# Patient Record
Sex: MALE | Race: WHITE
[De-identification: names, ages, dates, MRNs, and addresses within clinical notes are randomized per-mention and may not be internally consistent; named-entity substitution may affect disease eponyms.]

---

## 2019-03-06 ENCOUNTER — HOSPITAL ENCOUNTER (OUTPATIENT)
Dept: HOSPITAL 92 - ERS | Age: 49
Setting detail: OBSERVATION
LOS: 3 days | Discharge: HOME | End: 2019-03-09
Attending: INTERNAL MEDICINE | Admitting: INTERNAL MEDICINE
Payer: COMMERCIAL

## 2019-03-06 VITALS — BODY MASS INDEX: 25.9 KG/M2

## 2019-03-06 DIAGNOSIS — I50.22: ICD-10-CM

## 2019-03-06 DIAGNOSIS — Z95.1: ICD-10-CM

## 2019-03-06 DIAGNOSIS — Z88.1: ICD-10-CM

## 2019-03-06 DIAGNOSIS — E03.9: ICD-10-CM

## 2019-03-06 DIAGNOSIS — I25.10: ICD-10-CM

## 2019-03-06 DIAGNOSIS — I48.92: ICD-10-CM

## 2019-03-06 DIAGNOSIS — F41.9: ICD-10-CM

## 2019-03-06 DIAGNOSIS — Z79.01: ICD-10-CM

## 2019-03-06 DIAGNOSIS — Z88.0: ICD-10-CM

## 2019-03-06 DIAGNOSIS — I25.5: ICD-10-CM

## 2019-03-06 DIAGNOSIS — I48.0: ICD-10-CM

## 2019-03-06 DIAGNOSIS — R07.89: Primary | ICD-10-CM

## 2019-03-06 DIAGNOSIS — Z87.891: ICD-10-CM

## 2019-03-06 DIAGNOSIS — E78.5: ICD-10-CM

## 2019-03-06 DIAGNOSIS — I11.0: ICD-10-CM

## 2019-03-06 DIAGNOSIS — Z79.899: ICD-10-CM

## 2019-03-06 DIAGNOSIS — I25.2: ICD-10-CM

## 2019-03-06 DIAGNOSIS — Z79.82: ICD-10-CM

## 2019-03-06 DIAGNOSIS — F32.9: ICD-10-CM

## 2019-03-06 LAB
ALBUMIN SERPL BCG-MCNC: 4.5 G/DL (ref 3.5–5)
ALP SERPL-CCNC: 92 U/L (ref 40–150)
ALT SERPL W P-5'-P-CCNC: 17 U/L (ref 8–55)
ANION GAP SERPL CALC-SCNC: 17 MMOL/L (ref 10–20)
AST SERPL-CCNC: 22 U/L (ref 5–34)
BASOPHILS # BLD AUTO: 0 THOU/UL (ref 0–0.2)
BASOPHILS NFR BLD AUTO: 0.5 % (ref 0–1)
BILIRUB SERPL-MCNC: 1.8 MG/DL (ref 0.2–1.2)
BUN SERPL-MCNC: 17 MG/DL (ref 8.9–20.6)
CALCIUM SERPL-MCNC: 9.6 MG/DL (ref 7.8–10.44)
CHLORIDE SERPL-SCNC: 108 MMOL/L (ref 98–107)
CO2 SERPL-SCNC: 19 MMOL/L (ref 22–29)
CREAT CL PREDICTED SERPL C-G-VRATE: 0 ML/MIN (ref 70–130)
EOSINOPHIL # BLD AUTO: 0.2 THOU/UL (ref 0–0.7)
EOSINOPHIL NFR BLD AUTO: 2.2 % (ref 0–10)
GLOBULIN SER CALC-MCNC: 3 G/DL (ref 2.4–3.5)
GLUCOSE SERPL-MCNC: 74 MG/DL (ref 70–105)
HGB BLD-MCNC: 16.3 G/DL (ref 14–18)
LYMPHOCYTES # BLD: 1.1 THOU/UL (ref 1.2–3.4)
LYMPHOCYTES NFR BLD AUTO: 16.4 % (ref 21–51)
MCH RBC QN AUTO: 33.1 PG (ref 27–31)
MCV RBC AUTO: 102 FL (ref 78–98)
MONOCYTES # BLD AUTO: 0.7 THOU/UL (ref 0.11–0.59)
MONOCYTES NFR BLD AUTO: 9.8 % (ref 0–10)
NEUTROPHILS # BLD AUTO: 4.9 THOU/UL (ref 1.4–6.5)
NEUTROPHILS NFR BLD AUTO: 71.1 % (ref 42–75)
PLATELET # BLD AUTO: 100 THOU/UL (ref 130–400)
POTASSIUM SERPL-SCNC: 4.9 MMOL/L (ref 3.5–5.1)
RBC # BLD AUTO: 4.93 MILL/UL (ref 4.7–6.1)
SODIUM SERPL-SCNC: 139 MMOL/L (ref 136–145)
WBC # BLD AUTO: 6.9 THOU/UL (ref 4.8–10.8)

## 2019-03-06 PROCEDURE — 96374 THER/PROPH/DIAG INJ IV PUSH: CPT

## 2019-03-06 PROCEDURE — 80053 COMPREHEN METABOLIC PANEL: CPT

## 2019-03-06 PROCEDURE — 80048 BASIC METABOLIC PNL TOTAL CA: CPT

## 2019-03-06 PROCEDURE — 93005 ELECTROCARDIOGRAM TRACING: CPT

## 2019-03-06 PROCEDURE — 85025 COMPLETE CBC W/AUTO DIFF WBC: CPT

## 2019-03-06 PROCEDURE — 99285 EMERGENCY DEPT VISIT HI MDM: CPT

## 2019-03-06 PROCEDURE — 93010 ELECTROCARDIOGRAM REPORT: CPT

## 2019-03-06 PROCEDURE — 80306 DRUG TEST PRSMV INSTRMNT: CPT

## 2019-03-06 PROCEDURE — 83735 ASSAY OF MAGNESIUM: CPT

## 2019-03-06 PROCEDURE — 78452 HT MUSCLE IMAGE SPECT MULT: CPT

## 2019-03-06 PROCEDURE — 36415 COLL VENOUS BLD VENIPUNCTURE: CPT

## 2019-03-06 PROCEDURE — 96375 TX/PRO/DX INJ NEW DRUG ADDON: CPT

## 2019-03-06 PROCEDURE — 83880 ASSAY OF NATRIURETIC PEPTIDE: CPT

## 2019-03-06 PROCEDURE — 71045 X-RAY EXAM CHEST 1 VIEW: CPT

## 2019-03-06 PROCEDURE — 93017 CV STRESS TEST TRACING ONLY: CPT

## 2019-03-06 PROCEDURE — A9500 TC99M SESTAMIBI: HCPCS

## 2019-03-06 PROCEDURE — 94760 N-INVAS EAR/PLS OXIMETRY 1: CPT

## 2019-03-06 PROCEDURE — 93306 TTE W/DOPPLER COMPLETE: CPT

## 2019-03-06 PROCEDURE — G0378 HOSPITAL OBSERVATION PER HR: HCPCS

## 2019-03-06 PROCEDURE — 84484 ASSAY OF TROPONIN QUANT: CPT

## 2019-03-06 PROCEDURE — 80061 LIPID PANEL: CPT

## 2019-03-06 NOTE — RAD
ONE VIEW CHEST:

3/6/19

 

HISTORY: 

Pain.

 

COMPARISON:  

None.

 

FINDINGS:  

There is sternotomy wires. Epicardial pacer wires are also noted. Normal cardiac silhouette. Lungs an
d pleural spaces are clear.  No pneumothorax or osseous abnormalities.

 

IMPRESSION:  

No acute cardiopulmonary process. 

 

POS: [facility]

## 2019-03-07 LAB
CHD RISK SERPL-RTO: 2.9 (ref ?–4.5)
CHOLEST SERPL-MCNC: 95 MG/DL
DRUG SCREEN CUTOFF: (no result)
HDLC SERPL-MCNC: 33 MG/DL
LDLC SERPL CALC-MCNC: 56 MG/DL
MEDTOX CONTROL LINE VALID?: (no result)
MEDTOX READER #: (no result)
TRIGL SERPL-MCNC: 32 MG/DL (ref ?–150)
TROPONIN I SERPL DL<=0.01 NG/ML-MCNC: (no result) NG/ML (ref ?–0.03)
TROPONIN I SERPL DL<=0.01 NG/ML-MCNC: (no result) NG/ML (ref ?–0.03)

## 2019-03-07 RX ADMIN — Medication SCH ML: at 21:41

## 2019-03-07 RX ADMIN — ONDANSETRON PRN MG: 2 INJECTION INTRAMUSCULAR; INTRAVENOUS at 16:44

## 2019-03-07 RX ADMIN — NITROGLYCERIN SCH: 20 OINTMENT TOPICAL at 05:57

## 2019-03-07 RX ADMIN — ASPIRIN SCH MG: 81 TABLET ORAL at 14:34

## 2019-03-07 RX ADMIN — ONDANSETRON PRN MG: 2 INJECTION INTRAMUSCULAR; INTRAVENOUS at 21:44

## 2019-03-07 RX ADMIN — CYANOCOBALAMIN TAB 1000 MCG SCH MCG: 1000 TAB at 14:33

## 2019-03-07 RX ADMIN — Medication SCH ML: at 14:38

## 2019-03-07 RX ADMIN — ONDANSETRON PRN MG: 2 INJECTION INTRAMUSCULAR; INTRAVENOUS at 09:41

## 2019-03-07 RX ADMIN — NITROGLYCERIN SCH INCH: 20 OINTMENT TOPICAL at 21:41

## 2019-03-07 RX ADMIN — HYDROCODONE BITARTRATE AND ACETAMINOPHEN PRN TAB: 5; 325 TABLET ORAL at 10:02

## 2019-03-07 RX ADMIN — HYDROCODONE BITARTRATE AND ACETAMINOPHEN PRN TAB: 5; 325 TABLET ORAL at 05:55

## 2019-03-07 RX ADMIN — NITROGLYCERIN SCH INCH: 20 OINTMENT TOPICAL at 14:37

## 2019-03-07 NOTE — PDOC.PN
- Subjective


Encounter Start Date: 03/07/19


Encounter Start Time: 17:06


Subjective: cont to have chest pain, asking for iv morphine





- Objective


Resuscitation Status - Order Detail:





03/07/19 01:42


Resuscitation Status Routine 


   Resuscitation Status: FULL: Full Resuscitation








MAR Reviewed: Yes


Vital Signs & Weight: 


 Vital Signs (12 hours)











  Temp Pulse Resp BP Pulse Ox


 


 03/07/19 16:21  98.1 F  65  18  119/69  99


 


 03/07/19 13:53  97.5 F L  87  18  157/88 H  99


 


 03/07/19 08:11  97.7 F  58 L  18  115/66  97








 Weight











Weight                         186 lb 1.6 oz














Result Diagrams: 


 03/06/19 21:36





 03/06/19 21:36





Phys Exam





- Physical Examination


Constitutional: NAD


Neck: no JVD


Respiratory: clear to auscultation bilateral


Cardiovascular: RRR, no significant murmur


Gastrointestinal: soft, positive bowel sounds


Musculoskeletal: no edema





Dx/Plan


(1) Chest pain


Code(s): R07.9 - CHEST PAIN, UNSPECIFIED   Status: Acute   


Qualifiers: 


   Chest pain type: unspecified   Qualified Code(s): R07.9 - Chest pain, 

unspecified   





(2) CAD (coronary artery disease)


Code(s): I25.10 - ATHSCL HEART DISEASE OF NATIVE CORONARY ARTERY W/O ANG PCTRS 

  Status: Chronic   


Qualifiers: 


   Coronary Disease-Associated Artery/Lesion type: native artery   Native vs. 

transplanted heart: native heart   Associated angina: without angina   

Qualified Code(s): I25.10 - Atherosclerotic heart disease of native coronary 

artery without angina pectoris   





(3) Cardiomyopathy


Code(s): I42.9 - CARDIOMYOPATHY, UNSPECIFIED   Status: Chronic   


Qualifiers: 


   Cardiomyopathy type: ischemic   Qualified Code(s): I25.5 - Ischemic 

cardiomyopathy   





(4) HTN (hypertension)


Code(s): I10 - ESSENTIAL (PRIMARY) HYPERTENSION   Status: Chronic   


Qualifiers: 


   Hypertension type: essential hypertension   Qualified Code(s): I10 - 

Essential (primary) hypertension   





(5) Atrial fibrillation


Code(s): I48.91 - UNSPECIFIED ATRIAL FIBRILLATION   Status: Acute   


Qualifiers: 


   Atrial fibrillation type: paroxysmal   Qualified Code(s): I48.0 - Paroxysmal 

atrial fibrillation   





- Plan


stress test neg, discussed with patient. on no anticag, requested cardiolog


-: consult


-: cont home meds





* .

## 2019-03-07 NOTE — NM
NUCLEAR MEDICINE CARDIAC STRESS TEST WITH EJECTION FRACTION:

 

HISTORY:  

Chest pain. 

 

COMPARISON:  

None. 

 

TECHNIQUE:  

Stress and rest was performed after the intravenous administration of 30.6  and 9.6 mCi technetium-99
m sestamibi, respectively. 

 

FINDINGS:

There is a large inferior and lateral wall scar extending to the apex. There is dyskinesia of the inf
erior and lateral wall. 

 

The calculated ejection fraction measures 39%. 

 

IMPRESSION: 

1.  Large scar inferior and lateral wall extending to the apex without significant periinfarct ischem
ia appreciated. 

 

2.  Low ejection fraction of 39%. 

 

 

 

POS: [facility]

## 2019-03-07 NOTE — HP
PRIMARY CARE PHYSICIAN:  [facility].



The patient is seen and examined on the day of 2019.



REASON FOR ADMISSION:  Chest pain.



HISTORY OF PRESENT ILLNESS:  A 48-year-old male who has a history of aortic

dissection and coronary artery disease required open heart surgery with CABG in

.  At that time, the patient required hospitalization for about 
one

month.  The patient also reports that he has a history of MI twice when he was 
very

young and the second time at age of 42.  The patient also has underlying 
history of

congestive heart failure and the patient required several times ablation for 
atrial

tachycardia and atrial arrhythmia.  The patient is originally from [place], [place] 
and

he had surgery done and his cardiologist and his primary care physician in [place], [place].  He mainly follows [facility] in [place].  The patient also reports

that he has planned ablation for his atrial tachycardia in the near soon. 



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

that is why he was from [place] to here.  He was experiencing yesterday palpitation
,

but the palpitations subsided after couple of hours during nighttime.  This 
night

when he was working and cleaning his desk, at that point, the patient was having

chest pain as well as palpitation and that is why coworker called paramedics 
and the

patient was brought to emergency room for evaluation. 



Paramedics already gave him nitroglycerin sublingual as well as aspirin, Zofran
, and

the patient was feeling much better afterwards.  When I saw this patient in the

emergency room, he was not having any further chest pain.  He also reported that

when he had chest pain, at that time the pain radiated to right upper 
extremity.  He

denies any syncope.  He denies any orthopnea or PND.  He denies any lower 
extremity

edema or calf tenderness.  He denies any hemoptysis or cough which he started 
last

night.  He denies any flu-like illness. 



REVIEW OF SYSTEMS:  CONSTITUTIONAL:  Negative for weight loss or gain, sense of

well-being, ability to conduct usual activities, exercise tolerance. 

SKIN/BREAST:  Negative for rash, itching, changes in hair growth or loss, nail

changes, breast lumps, tenderness, swelling, nipple discharge. 

EYES:  Negative for vision, double vision, tearing, blind spots, pain. 

ENT/MOUTH:  Negative for headaches, vertigo, lightheadedness, injury. Vision, 
double

vision, tearing, blind spots, pain, nose bleeding, colds, obstruction, discharge
,

dental difficulties, gingival bleeding, dentures, neck stiffness, pain, 
tenderness,

masses in thyroid or other areas 

CARDIOVASCULAR:  Negative for precordial pain, substernal distress, palpitations
,

syncope, dyspnea on exertion, orthopnea, nocturnal paroxysmal dyspnea, edema,

cyanosis, hypertension, heart murmurs, varicosities, phlebitis, claudication. 

RESPIRATORY:  Negative for pain, shortness of breath, wheezing, stridor, cough,

hemoptysis, fever or night sweats 

GASTROINTESTINAL:  Negative for poor appetite, dysphagia, indigestion, abdominal

pain, heartburn, eructation, nausea, vomiting, hematemesis, jaundice, 
constipation,

or diarrhea, abnormal stools (erika-colored, tarry, bloody, greasy, foul smelling
),

flatulence, hemorrhoids, recent changes in bowel habits. 

GENITOURINARY:  Negative for urgency, frequency, dysuria, nocturia, hematuria,

polyuria, oliguria, unusual (or change in) color of urine, stones, hesitancy, 
change

in size of stream, dribbling, acute retention or incontinence, libido, potency. 

MUSCULOSKELETAL:  Negative for pain, swelling, redness or heat of muscles or 
joints,

limitation of motion, muscular weakness, atrophy, cramps. 

NEUROLOGIC/PSYCHIATRIC:  Negative for convulsions, paralyses, tremor,

incoordination, parasthesias, difficulties with memory of speech, sensory or 
motor

disturbances, or muscular coordination (ataxia, tremor), emotional problems,

anxiety, depression, previous psychiatric care, unusual perceptions, 
hallucinations. 

ALLERGY/IMMUNOLOGIC:  Negative for skin rash, anemia, bleeding tendency, 
polydipsia,

polyuria, intolerance to heat or cold. 



Please see my HPI for pertinent positives and negatives.  All other review of

systems reviewed and negative except as mentioned in HPI. 



PAST MEDICAL HISTORY:  Per patient, the patient has history of aortic dissection
,

required surgery, 2 MI required CABG, hypertension dyslipidemia, 
hypothyroidism. 



PAST SURGICAL HISTORY:  Aortic dissection repair and CABG.



PAST PSYCHIATRIC HISTORY:  Anxiety and depression.



SOCIAL HISTORY:  The patient is a former smoker.  He quit smoking 10 years ago.
  He

drinks alcohol socially.  He denies any other illicit drug abuse. 



FAMILY HISTORY:  Positive for coronary artery disease and cancer among several

family members.  Father  from lung cancer. 



ALLERGIES:  AZITHROMYCIN AND PENICILLIN.



CURRENT HOME MEDICATIONS:  

1. Aspirin 81 mg daily.

2. Coreg 25 mg twice daily.

3. Lasix 40 mg daily.

4. Levothyroxine 100 mcg p.o. daily.

5. Losartan 50 mg p.o. daily.

6. Nitroglycerin p.r.n. basis.

7. Zoloft 50 mg daily.

8. Aldactone 25 mg p.o. daily.



EMERGENCY ROOM COURSE:  The patient is given Toradol 30 mg, hydralazine 10 mg,

nitroglycerin patch, Zofran 8 mg, morphine 4 mg, and Tylenol 1 g. 



PHYSICAL EXAMINATION:

VITAL SIGNS:  Currently, blood pressure 147/83, pulse 69, respiratory rate 20,

temperature 98.7, saturation 100% on room air.  Weight 81.7 kg. 

GENERAL:  The patient is currently alert, awake, hypertensive, apprehensive. 

HEENT:  Head; normocephalic, atraumatic.  Eyes; pupils round, reactive to light.

Extraocular muscle intact.  ENT, oropharynx within normal limits.  Moist mucous

membranes.  No oral lesion.  No pharyngeal erythema.  No exudate. 

NECK:  Supple.  No JVD.  No thyromegaly.  No carotid bruit. 

LUNGS:  Clear to auscultation without any rhonchi or rales. 

CARDIAC:  S1 and S2, irregular.  No murmur elicited.  No gallop.  No rub. 

ABDOMEN:  Soft.  Bowel sounds present.  Nontender.  Nondistended.  No 
organomegaly.

No mass.  No suprapubic tenderness. 

BACK:  Unremarkable.  No CVA tenderness. 

EXTREMITIES:  Upper extremities, passive movement of all joints are normal.  
Lower

extremity, trace lower extremity edema noted.  Good distal pulsation.  No calf

tenderness. 

SKIN:  No skin rash. 

HEMATOLOGICAL SYSTEM:  No lymphadenopathy. 

PSYCHIATRIC:  Normal affect. 

NEUROLOGIC:  Nonfocal examination.



SIGNIFICANT LABORATORY DATA:  EKG showing atrial fibrillation with controlled

ventricular response, nonspecific ST-T changes in lead I and aVL. 



Chest x-ray showing cardiomegaly, post CABG changes. 



CBC:  WBC 6.9, hemoglobin 16.3, , platelets 100.  BMP:  Sodium 139, 
potassium

4.9, chloride 108, carbon dioxide 19, anion gap 17, BUN 17, creatinine 0.93, 
glucose

74, calcium 9.4.  LFT:  AST 22, ALT 17, alkaline phosphatase 92, albumin 4.5,

bilirubin 1.8.  Troponin 0.010.  .8. 



ASSESSMENT AND PLAN:  

1. Chest pain and palpitation.  The patient has complicated cardiac history

including aortic dissection and coronary artery disease with myocardial 
infarction

and several atrial arrhythmias in past, required ablation.  This patient's 
current

EKG showing atrial fibrillation with controlled ventricular response.  His chest

x-ray is unremarkable.  His troponin is negative.  The patient will be observed 
in

hospital and we will do serial cardiac enzyme x3.  We will perform nuclear 
medicine

exercise Cardiolite stress test.  We will check urine drug screen.  Meanwhile, 
we

will continue with aspirin 325 mg p.o. daily and nitroglycerin patch q.8 hourly.

Monitor on telemetry floor.  Check lipid profile, TSH for risk stratification. 

2. Hypertension.  We will continue Coreg 25 mg p.o. twice daily and losartan 50 
mg

p.o. daily.  Use p.r.n. basis hydralazine. 

3. Anxiety and depression.  We will continue Zoloft 50 mg p.o. daily.

4. Hypothyroidism.  We will check TSH and continue his home dose of 
levothyroxine

150 mcg p.o. daily. 

5. Congestive heart failure, type is not known, but suspecting systolic heart

failure.  The patient is currently euvolemic.  We will continue Coreg 25 mg p.o.

twice daily, losartan 50 mg p.o. daily, and Lasix 40 mg p.o. daily.  We will 
obtain

echocardiography for further evaluation. 

6. Macrocytosis and thrombocytopenia, etiology uncertain at this point.  We will

continue with folic acid and vitamin B12 therapy. 

7. Elevated BNP.  We will get echocardiography to assess EF.

8. Deep venous thrombosis prophylaxis not needed because we are expecting 
discharge

in 24 hours. 

9. Gastrointestinal prophylaxis.  Pepcid 20 mg p.o. b.i.d.



CODE STATUS:  The patient is full code.  The patient's mother is surrogate 
decision

maker. 



DISPOSITION PLAN:  Based on above-mentioned investigation result.  Plan of care

discussed with the patient in detail. 



The patient is seen and examined on the day of 2019.







Job ID:  [id number]



[facility]

## 2019-03-08 RX ADMIN — NITROGLYCERIN PRN INCH: 20 OINTMENT TOPICAL at 23:18

## 2019-03-08 RX ADMIN — ASPIRIN SCH MG: 81 TABLET ORAL at 09:44

## 2019-03-08 RX ADMIN — NITROGLYCERIN PRN INCH: 20 OINTMENT TOPICAL at 14:47

## 2019-03-08 RX ADMIN — ONDANSETRON PRN MG: 2 INJECTION INTRAMUSCULAR; INTRAVENOUS at 04:41

## 2019-03-08 RX ADMIN — Medication SCH ML: at 20:49

## 2019-03-08 RX ADMIN — Medication SCH ML: at 09:46

## 2019-03-08 RX ADMIN — NITROGLYCERIN SCH MG: 0.4 TABLET SUBLINGUAL at 22:09

## 2019-03-08 RX ADMIN — NITROGLYCERIN SCH MG: 0.4 TABLET SUBLINGUAL at 18:07

## 2019-03-08 RX ADMIN — ONDANSETRON PRN MG: 2 INJECTION INTRAMUSCULAR; INTRAVENOUS at 16:59

## 2019-03-08 RX ADMIN — NITROGLYCERIN SCH INCH: 20 OINTMENT TOPICAL at 05:41

## 2019-03-08 RX ADMIN — ONDANSETRON PRN MG: 2 INJECTION INTRAMUSCULAR; INTRAVENOUS at 10:50

## 2019-03-08 RX ADMIN — CYANOCOBALAMIN TAB 1000 MCG SCH MCG: 1000 TAB at 09:45

## 2019-03-08 NOTE — PDOC.EVN
Event Note





- Event Note


Event Note: 





Patient complaining of CP. Will get STAT EKG and Troponins. Hold discharge for 

now.

## 2019-03-09 VITALS — SYSTOLIC BLOOD PRESSURE: 110 MMHG | DIASTOLIC BLOOD PRESSURE: 71 MMHG | TEMPERATURE: 97.7 F

## 2019-03-09 LAB
ANION GAP SERPL CALC-SCNC: 10 MMOL/L (ref 10–20)
BASOPHILS # BLD AUTO: 0 THOU/UL (ref 0–0.2)
BASOPHILS NFR BLD AUTO: 0.5 % (ref 0–1)
BUN SERPL-MCNC: 10 MG/DL (ref 8.9–20.6)
CALCIUM SERPL-MCNC: 8.5 MG/DL (ref 7.8–10.44)
CHLORIDE SERPL-SCNC: 107 MMOL/L (ref 98–107)
CO2 SERPL-SCNC: 25 MMOL/L (ref 22–29)
CREAT CL PREDICTED SERPL C-G-VRATE: 121 ML/MIN (ref 70–130)
EOSINOPHIL # BLD AUTO: 0.1 THOU/UL (ref 0–0.7)
EOSINOPHIL NFR BLD AUTO: 2.2 % (ref 0–10)
GLUCOSE SERPL-MCNC: 77 MG/DL (ref 70–105)
HGB BLD-MCNC: 13.5 G/DL (ref 14–18)
LYMPHOCYTES # BLD: 1.8 THOU/UL (ref 1.2–3.4)
LYMPHOCYTES NFR BLD AUTO: 41.9 % (ref 21–51)
MAGNESIUM SERPL-MCNC: 1.9 MG/DL (ref 1.6–2.6)
MCH RBC QN AUTO: 32.7 PG (ref 27–31)
MCV RBC AUTO: 101 FL (ref 78–98)
MONOCYTES # BLD AUTO: 0.5 THOU/UL (ref 0.11–0.59)
MONOCYTES NFR BLD AUTO: 10.7 % (ref 0–10)
NEUTROPHILS # BLD AUTO: 1.9 THOU/UL (ref 1.4–6.5)
NEUTROPHILS NFR BLD AUTO: 44.8 % (ref 42–75)
PLATELET # BLD AUTO: 81 THOU/UL (ref 130–400)
POTASSIUM SERPL-SCNC: 3.8 MMOL/L (ref 3.5–5.1)
RBC # BLD AUTO: 4.15 MILL/UL (ref 4.7–6.1)
SODIUM SERPL-SCNC: 138 MMOL/L (ref 136–145)
WBC # BLD AUTO: 4.2 THOU/UL (ref 4.8–10.8)

## 2019-03-09 RX ADMIN — ASPIRIN SCH MG: 81 TABLET ORAL at 13:25

## 2019-03-09 RX ADMIN — NITROGLYCERIN SCH MG: 0.4 TABLET SUBLINGUAL at 08:27

## 2019-03-09 RX ADMIN — Medication SCH ML: at 08:53

## 2019-03-09 RX ADMIN — NITROGLYCERIN PRN INCH: 20 OINTMENT TOPICAL at 08:42

## 2019-03-09 RX ADMIN — CYANOCOBALAMIN TAB 1000 MCG SCH MCG: 1000 TAB at 08:52

## 2019-03-09 RX ADMIN — ONDANSETRON PRN MG: 2 INJECTION INTRAMUSCULAR; INTRAVENOUS at 08:42

## 2019-03-09 NOTE — PDOC.CTH
Cardiology Progress Note





- Subjective





Called back by nurse/hospitalist because patient still having random episodes 

of CP. Unrelated to exertion. Also c/o palpitations. Patient reports having 

this pain intermittently for months. Says the pain is why he is scheduled for 

ablation in the near future. Has discussed with primary cardiologist in the 

past. Gives history of last EF 35-40% possibly. Current ECHO 25-30% and 

LifeVest about to be placed. Rhythm has been atrial tach/flutter that is rate-

controlled. On Eliquis. Patient with EKGs showing no ST segment changes. 

Negative trop. Stress CL with inferior and lateral scar and no ischemia.





- Objective


 Vital Signs











  Temp Pulse Resp BP Pulse Ox


 


 03/09/19 11:56  98.0 F  59 L  18  116/68  95


 


 03/09/19 07:55  97.7 F  57 L  18  157/86 H 


 


 03/09/19 04:46  97.7 F  57 L  15  122/74  92 L








 











Weight                         186 lb 1.6 oz














 











 03/08/19 03/09/19 03/10/19





 06:59 06:59 07:59


 


Intake Total 1360 1040 


 


Output Total 1950 2550 


 


Balance -590 -1510 














- Physical Examination


General/Neuro: alert & oriented x3


Neck: no JVD present





- Telemetry


Telemetry Rhythm: Atrial tach/flutter





- Labs


Result Diagrams: 


 03/09/19 06:00





 03/09/19 06:00


 Troponin/CKMB











Troponin I  Less than  0.010 ng/mL (< 0.028)   03/09/19  06:00    














- Assessment/Plan





1. Atypical CP


2. ICMO


3. Atrial Tach/Flutter


4. CAD s/p CABG


5. History of TAA repair in the past





Pain is atypical in nature. Recent stress negative for ischemia. Negative 

trops. No EKG changes. Patient does report palpitations related to arrhythmia. 

Will add Imdur. F/U with primary cardiologist and EP physician in [place] in the 

next week.

## 2019-03-09 NOTE — PDOC.PN
- Subjective


Encounter Start Date: 03/09/19


Encounter Start Time: 12:21





Mr. Miles was seen today in follow-up. He has not had any chest pain this 

morning. 





- Objective


Resuscitation Status - Order Detail:





03/07/19 01:42


Resuscitation Status Routine 


   Resuscitation Status: FULL: Full Resuscitation








MAR Reviewed: Yes


Vital Signs & Weight: 


 Vital Signs (12 hours)











  Temp Pulse Resp BP Pulse Ox


 


 03/09/19 11:56  98.0 F  59 L  18  116/68  95


 


 03/09/19 07:55  97.7 F  57 L  18  157/86 H 


 


 03/09/19 04:46  97.7 F  57 L  15  122/74  92 L








 Weight











Weight                         186 lb 1.6 oz














I&O: 


 











 03/08/19 03/09/19 03/10/19





 06:59 06:59 07:59


 


Intake Total 1360 1040 


 


Output Total 1950 2550 


 


Balance -590 -1510 











Result Diagrams: 


 03/09/19 06:00





 03/09/19 06:00





Phys Exam





- Physical Examination


HEENT: PERRLA


Respiratory: no wheezing, no rales, no rhonchi, clear to auscultation bilateral


Cardiovascular: RRR, no significant murmur, no rub


Gastrointestinal: soft, non-tender, no distention, positive bowel sounds


Musculoskeletal: no edema





Dx/Plan


(1) Chest pain


Code(s): R07.9 - CHEST PAIN, UNSPECIFIED   Status: Acute   


Qualifiers: 


   Chest pain type: unspecified   Qualified Code(s): R07.9 - Chest pain, 

unspecified   





(2) Atrial fibrillation


Code(s): I48.91 - UNSPECIFIED ATRIAL FIBRILLATION   Status: Chronic   


Qualifiers: 


   Atrial fibrillation type: paroxysmal   Qualified Code(s): I48.0 - Paroxysmal 

atrial fibrillation   





(3) CAD (coronary artery disease)


Code(s): I25.10 - ATHSCL HEART DISEASE OF NATIVE CORONARY ARTERY W/O ANG PCTRS 

  Status: Chronic   


Qualifiers: 


   Coronary Disease-Associated Artery/Lesion type: native artery   Native vs. 

transplanted heart: native heart   Associated angina: without angina   

Qualified Code(s): I25.10 - Atherosclerotic heart disease of native coronary 

artery without angina pectoris   





(4) Cardiomyopathy


Code(s): I42.9 - CARDIOMYOPATHY, UNSPECIFIED   Status: Chronic   


Qualifiers: 


   Cardiomyopathy type: ischemic   Qualified Code(s): I25.5 - Ischemic 

cardiomyopathy   





(5) HTN (hypertension)


Code(s): I10 - ESSENTIAL (PRIMARY) HYPERTENSION   Status: Chronic   


Qualifiers: 


   Hypertension type: essential hypertension   Qualified Code(s): I10 - 

Essential (primary) hypertension   





- Plan





* Chest pain- discussed with Cardiology- this is not likely due to coronary 

artery disease


* Stress test was negative,a s well as his Cardiac enzymes


* Will adjust his medications


* He is stable for discharge home.

## 2019-03-09 NOTE — DIS
DATE OF ADMISSION:  03/06/2019



DATE OF DISCHARGE:  03/09/2019



DISCHARGE DISPOSITION:  Home.



PRIMARY DISCHARGE DIAGNOSES:  

1. Chest pain, probable noncardiac.

2. History of atrial tachycardia and atrial fibrillation.

3. History of aortic dissection.

4. Coronary artery disease, status post bypass.

5. Hypertension.

6. Dyslipidemia.

7. Hypothyroidism.



DISCHARGE MEDICATIONS:  Include;

1. Imdur 15 mg extended release daily.

2. Folic acid 1 mg daily.

3. Vitamin B12 1000 mcg p.o. daily.

4. Eliquis 5 mg twice a day.

5. Spironolactone 25 mg daily.

6. Sertraline 25 mg daily.

7. Nitrostat 0.4 sublingual p.r.n.

8. Cozaar 50 mg daily.

9. Levothyroxine 50 mcg daily.

10. Norco 7.5/325 q.6h as needed.

11. Lasix 40 mg daily.

12. Carvedilol 25 mg twice a day.

13. Aspirin 81 mg daily.

14. Vitamin C 500 mg daily.



PROCEDURES DONE DURING ADMISSION:  The patient had a nuclear stress test, which had

demonstrated a large scar in the inferior lateral wall extending to the apex without

any significant bradley-infarct ischemia.  The ejection fraction was estimated at 39%.

The patient had an echocardiogram in which the EF was estimated at 25% to 30%.

There is an inferior apical wall, was akinetic, moderately enlarged right

ventricular cavity and moderate-to-severe tricuspid regurgitation. 



CODE STATUS:  Full code.



ALLERGIES:  AZITHROMYCIN AND PENICILLIN.



HOSPITAL COURSE:  Mr. Miles is a pleasant 48-year-old gentleman, who was in town due

to a job transfer at work.  This was a temporary transfer.  He normally gets his

care in [place], [place].  He presented with chest pain and palpitations.  He has a known

history of atrial tachycardia and atrial flutter and there was a plan for an

ablation procedure upcoming in [place].  He was placed in observation and ruled out.

Stress test was negative and he was evaluated by Cardiology as well.  An echo was

done due to the ejection fraction being somewhat low on the stress test.  The EF was

in the range of requiring AICD.  Therefore, a LifeVest was ordered and the patient

was fitted for this prior to discharge and he was placed on Imdur as well as Eliquis

due to the paroxysmal atrial fibrillation noticed on telemetry and it is being

recommended to follow up with his cardiologist in [place] as soon as possible. 







Job ID:  [id number]

## 2019-03-11 NOTE — STRESS
Acquisition Time: 2019-03-07  12:05:04

Total Exercise Time: 00:04:00

Test Indications: CHEST PAIN

Medications: 

 

 

 

 

 

 

 

 

 

Protocol: ADENOSINE

Max HR: 075 BPM  44% of  Pred: 170 BPM

Max BP: 130/076 mmHG

Max Work Load: 1.0 METS

 

RESTING ECG: NORMAL SINUS RHYTHM AT 70 BPM WITH SECOND DEGREE TYPE I HEART BLOCK

 AND NON-SPECIFIC ST SEGMENT AND T-WAVE ABNORMALITIES

SYMPTOMS: LIGHTHEADED

APPROPRIATE BLOOD PRESSURE RESPONSE FOR ADENOSINE

ECTOPY: RARE PVCs

ECG RESPONSE: NO SIGNIFICANT CHANGES

INTERPRETATION: INDETERMINATE ECG/AWAIT NUCLEAR IMAGES FOR DEFINITIVE DIAGNOSIS

 

Confirmed by SHAMIKA DURANT ([id number]) on 3/11/2019 11:45:52 AM

 

Referred By: MD KAYLIN GARCIA           Confirmed By:SHAMIKA DURANT

## 2019-03-11 NOTE — DIS
DATE OF ADMISSION:  03/06/2019



DATE OF DISCHARGE:  03/09/2019



DISCHARGE DISPOSITION:  To home.



FOLLOWUP:  

1. Follow up with primary care physician, Dr. Massimo Obrien in [place].

2. Follow up with primary cardiologist, Dr. Moose Pemberton in [place].



ALLERGIES:  THE PATIENT IS ALLERGIC TO AZITHROMYCIN AND PENICILLIN. 



THE PATIENT WAS SEEN AND EXAMINED ON THE DAY OF DISCHARGE.  DENIES ANY NEW

COMPLAINTS.  NO CHEST PAIN, SHORTNESS OF BREATH, OR PALPITATIONS REPORTED. 



BRIEF HOSPITAL COURSE:  The patient is a 48-year-old male with coronary artery

disease, status post CABG; hypertension, and dyslipidemia, who presented to the

hospital with chest discomfort.  Please refer to the history and physical dated

March 6, 2019 for further details. 



The patient was admitted to the hospital with a diagnosis of chest discomfort, rule

out acute coronary syndrome.  His serial troponins were negative.  BNP was 251.

Fasting lipid profile showed cholesterol 95, LDL 56, triglycerides 32 with HDL of

33.  He was evaluated by Cardiology.  An echocardiogram was obtained that showed

ejection fraction 25% to 30% with mild aortic regurgitation, moderate-to-severe

tricuspid regurgitation, mild mitral regurgitation.  He was found to have

intermittent atrial flutter with underlying atrial fibrillation.  Anticoagulation

was recommended by Cardiology.  The plan was discussed with the patient and his

mother over the phone.  They decided to start Eliquis.  He understands the risks

associated with the Eliquis.  He was advised to follow up with his primary

cardiologist in next 2 to 3 days.  He also follows with his electrophysiologist in

[place].  He underwent a Cardiolite stress test this admission that was negative for

reversible ischemia.  There was a large scar in the inferior and the lateral wall

extending to the apex without significant bradley-infarct ischemia.  Ejection fraction

on the stress test was 39%.  LifeVest will be arranged prior to discharge. 



FINAL DIAGNOSES:  

1. Chest discomfort, acute coronary syndrome ruled out.

2. No reversible ischemia on the stress test.

3. Palpitations secondary to atrial fibrillation/flutter, started on

anticoagulation.  His rate is controlled. 

4. Hypothyroidism.

5. Anxiety.

6. Hypertension.

7. Chronic systolic heart failure, ejection fraction 25% to 30%.

8. Macrocytosis.  The patient has been started on vitamin B12 and folic acid.



PLAN:  Plan of care was discussed with the patient and the family at the bedside,

they stated understanding. 







Job ID:  [id number]

## 2019-03-12 NOTE — EKG
Test Reason : 

Blood Pressure : ***/*** mmHG

Vent. Rate : 057 BPM     Atrial Rate : 057 BPM

   P-R Int : 222 ms          QRS Dur : 114 ms

    QT Int : 428 ms       P-R-T Axes : 022 020 127 degrees

   QTc Int : 416 ms

 

Sinus bradycardia with 1st degree A-V block

Inferior infarct (cited on or before 06-MAR-2019)



Abnormal ECG

Confirmed by JOSE PALMRE ([id number]) on 3/12/2019 9:40:41 AM

 

Referred By:             Confirmed By:JOSE PALMER